# Patient Record
Sex: FEMALE | Race: ASIAN | NOT HISPANIC OR LATINO | URBAN - METROPOLITAN AREA
[De-identification: names, ages, dates, MRNs, and addresses within clinical notes are randomized per-mention and may not be internally consistent; named-entity substitution may affect disease eponyms.]

---
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
## 2023-05-29 NOTE — ED PROVIDER NOTE - DISCHARGE DATE
[FreeTextEntry3] : I saw and evaluated the patient with NP Finn.  I discussed the care of this patient with the NP providing the service, and was directly responsible for the patient's management. My discussion with the NP included the patient's history, physical exam, laboratory findings, and medical decision-making. I agree with the documented findings and plan of care of the NP's note. Spent > 30 minutes collectively in reviewing records, performing patient history and physical examination, and formulating recommendations/plan of care.\par  19-Apr-2018

## 2025-01-24 NOTE — ED PEDIATRIC NURSE NOTE - TEMPLATE
01/24/25 1551   Discharge Planning   Living Arrangements Alone   Support Systems Children   Assistance Needed none   Type of Residence Private residence   Number of Stairs to Enter Residence 1   Number of Stairs Within Residence 0   Do you have animals or pets at home? Yes   Type of Animals or Pets dog   Who is requesting discharge planning? Provider   Home or Post Acute Services None   Expected Discharge Disposition Home H   Does the patient need discharge transport arranged? No   Financial Resource Strain   How hard is it for you to pay for the very basics like food, housing, medical care, and heating? Not very   Housing Stability   In the last 12 months, was there a time when you were not able to pay the mortgage or rent on time? N   In the past 12 months, how many times have you moved where you were living? 0   At any time in the past 12 months, were you homeless or living in a shelter (including now)? N   Transportation Needs   In the past 12 months, has lack of transportation kept you from medical appointments or from getting medications? no   In the past 12 months, has lack of transportation kept you from meetings, work, or from getting things needed for daily living? No   Patient Choice   Patient / Family choosing to utilize agency / facility established prior to hospitalization No   Stroke Family Assessment   Stroke Family Assessment Needed No   Intensity of Service   Intensity of Service 0-30 min     Met with patient and son Eriberto at bedside. Introduced self and role in hospital. Verified address and PCP is Dr. Calvin Baker. Uses [facility] in [place] for medications and has no issues obtaining/paying for them and son manages all her medications. Patient does not use any ambulation devices, still drives, did her own grocery shopping and is independent with ADL's. Therapy recommends HHC on discharge and son and patient are agreeable. PTA about 3 weeks ago patient was very active and hopes to  return to that on discharge. Son will be patient's transport home on discharge. CT team to follow patient for discharge needs.    General
